# Patient Record
Sex: FEMALE | Race: BLACK OR AFRICAN AMERICAN | NOT HISPANIC OR LATINO | Employment: OTHER | URBAN - METROPOLITAN AREA
[De-identification: names, ages, dates, MRNs, and addresses within clinical notes are randomized per-mention and may not be internally consistent; named-entity substitution may affect disease eponyms.]

---

## 2017-01-18 ENCOUNTER — OFFICE VISIT - HEALTHEAST (OUTPATIENT)
Dept: GERIATRICS | Facility: CLINIC | Age: 77
End: 2017-01-18

## 2017-01-18 ENCOUNTER — AMBULATORY - HEALTHEAST (OUTPATIENT)
Dept: ADMINISTRATIVE | Facility: CLINIC | Age: 77
End: 2017-01-18

## 2017-01-18 DIAGNOSIS — R52 PAIN: ICD-10-CM

## 2017-01-18 DIAGNOSIS — E46 MALNUTRITION (H): ICD-10-CM

## 2017-01-18 DIAGNOSIS — R62.51 FAILURE TO THRIVE (0-17): ICD-10-CM

## 2017-01-18 DIAGNOSIS — R13.10 DYSPHAGIA: ICD-10-CM

## 2017-01-18 DIAGNOSIS — C90.00 MULTIPLE MYELOMA (H): ICD-10-CM

## 2017-01-18 RX ORDER — OXYCODONE HYDROCHLORIDE 5 MG/1
2.5-5 TABLET ORAL EVERY 4 HOURS PRN
Status: SHIPPED | COMMUNITY
Start: 2017-01-18

## 2017-01-18 RX ORDER — LIDOCAINE 50 MG/G
1 PATCH TOPICAL 2 TIMES DAILY
Status: SHIPPED | COMMUNITY
Start: 2017-01-18

## 2017-01-18 RX ORDER — MECOBALAMIN 5000 MCG
15 TABLET,DISINTEGRATING ORAL 2 TIMES DAILY
Status: SHIPPED | COMMUNITY
Start: 2017-01-18

## 2017-01-18 RX ORDER — ACETAMINOPHEN 500 MG
1000 TABLET ORAL 3 TIMES DAILY
Status: SHIPPED | COMMUNITY
Start: 2017-01-18

## 2017-01-18 RX ORDER — MIRTAZAPINE 7.5 MG/1
7.5 TABLET, FILM COATED ORAL AT BEDTIME
Status: SHIPPED | COMMUNITY
Start: 2017-01-18

## 2017-01-24 ENCOUNTER — OFFICE VISIT - HEALTHEAST (OUTPATIENT)
Dept: GERIATRICS | Facility: CLINIC | Age: 77
End: 2017-01-24

## 2017-01-24 DIAGNOSIS — R52 PAIN: ICD-10-CM

## 2017-01-24 DIAGNOSIS — C90.00 MULTIPLE MYELOMA (H): ICD-10-CM

## 2017-01-24 DIAGNOSIS — E46 MALNUTRITION (H): ICD-10-CM

## 2017-01-27 ENCOUNTER — OFFICE VISIT - HEALTHEAST (OUTPATIENT)
Dept: GERIATRICS | Facility: CLINIC | Age: 77
End: 2017-01-27

## 2017-01-27 DIAGNOSIS — E46 PROTEIN MALNUTRITION (H): ICD-10-CM

## 2017-01-27 DIAGNOSIS — R41.89 COGNITIVE IMPAIRMENT: ICD-10-CM

## 2017-01-27 DIAGNOSIS — C90.00 MULTIPLE MYELOMA (H): ICD-10-CM

## 2017-01-27 DIAGNOSIS — R53.81 PHYSICAL DECONDITIONING: ICD-10-CM

## 2017-01-27 DIAGNOSIS — R53.1 GENERAL WEAKNESS: ICD-10-CM

## 2017-01-27 DIAGNOSIS — R13.10 DYSPHAGIA: ICD-10-CM

## 2017-01-31 ENCOUNTER — OFFICE VISIT - HEALTHEAST (OUTPATIENT)
Dept: GERIATRICS | Facility: CLINIC | Age: 77
End: 2017-01-31

## 2017-01-31 DIAGNOSIS — R53.1 GENERAL WEAKNESS: ICD-10-CM

## 2017-01-31 DIAGNOSIS — C90.00 MULTIPLE MYELOMA (H): ICD-10-CM

## 2017-01-31 DIAGNOSIS — R53.81 PHYSICAL DECONDITIONING: ICD-10-CM

## 2017-01-31 DIAGNOSIS — E46 PROTEIN MALNUTRITION (H): ICD-10-CM

## 2017-01-31 DIAGNOSIS — R52 UNCONTROLLED PAIN: ICD-10-CM

## 2017-02-03 ENCOUNTER — OFFICE VISIT - HEALTHEAST (OUTPATIENT)
Dept: GERIATRICS | Facility: CLINIC | Age: 77
End: 2017-02-03

## 2017-02-03 DIAGNOSIS — C90.00 MULTIPLE MYELOMA (H): ICD-10-CM

## 2017-02-03 DIAGNOSIS — R53.81 PHYSICAL DECONDITIONING: ICD-10-CM

## 2017-02-03 DIAGNOSIS — R53.1 GENERAL WEAKNESS: ICD-10-CM

## 2017-02-03 DIAGNOSIS — R52 UNCONTROLLED PAIN: ICD-10-CM

## 2017-02-03 DIAGNOSIS — E46 PROTEIN MALNUTRITION (H): ICD-10-CM

## 2017-02-06 ENCOUNTER — AMBULATORY - HEALTHEAST (OUTPATIENT)
Dept: GERIATRICS | Facility: CLINIC | Age: 77
End: 2017-02-06

## 2017-02-06 ENCOUNTER — COMMUNICATION - HEALTHEAST (OUTPATIENT)
Dept: GERIATRICS | Facility: CLINIC | Age: 77
End: 2017-02-06

## 2017-02-07 ENCOUNTER — COMMUNICATION - HEALTHEAST (OUTPATIENT)
Dept: GERIATRICS | Facility: CLINIC | Age: 77
End: 2017-02-07

## 2021-03-09 ENCOUNTER — AMBULATORY - HEALTHEAST (OUTPATIENT)
Dept: NURSING | Facility: CLINIC | Age: 81
End: 2021-03-09

## 2021-03-29 ENCOUNTER — AMBULATORY - HEALTHEAST (OUTPATIENT)
Dept: NURSING | Facility: CLINIC | Age: 81
End: 2021-03-29

## 2021-04-02 ENCOUNTER — RECORDS - HEALTHEAST (OUTPATIENT)
Dept: RADIOLOGY | Facility: CLINIC | Age: 81
End: 2021-04-02

## 2021-04-02 ENCOUNTER — HOSPITAL ENCOUNTER (OUTPATIENT)
Dept: PHYSICAL MEDICINE AND REHAB | Facility: CLINIC | Age: 81
Discharge: HOME | End: 2021-04-02
Attending: PHYSICIAN ASSISTANT

## 2021-04-02 DIAGNOSIS — C90.00 MULTIPLE MYELOMA, REMISSION STATUS UNSPECIFIED (H): ICD-10-CM

## 2021-04-02 DIAGNOSIS — M48.062 SPINAL STENOSIS OF LUMBAR REGION WITH NEUROGENIC CLAUDICATION: ICD-10-CM

## 2021-04-02 DIAGNOSIS — M43.16 SPONDYLOLISTHESIS OF LUMBAR REGION: ICD-10-CM

## 2021-04-02 DIAGNOSIS — G89.29 CHRONIC BILATERAL LOW BACK PAIN WITHOUT SCIATICA: ICD-10-CM

## 2021-04-02 DIAGNOSIS — M54.50 CHRONIC BILATERAL LOW BACK PAIN WITHOUT SCIATICA: ICD-10-CM

## 2021-04-02 DIAGNOSIS — M47.816 LUMBAR FACET ARTHROPATHY: ICD-10-CM

## 2021-04-02 ASSESSMENT — MIFFLIN-ST. JEOR: SCORE: 1098.65

## 2021-04-04 ENCOUNTER — HOSPITAL ENCOUNTER (OUTPATIENT)
Dept: MRI IMAGING | Facility: CLINIC | Age: 81
Discharge: HOME | End: 2021-04-04
Attending: PHYSICIAN ASSISTANT

## 2021-04-04 DIAGNOSIS — M54.50 CHRONIC BILATERAL LOW BACK PAIN WITHOUT SCIATICA: ICD-10-CM

## 2021-04-04 DIAGNOSIS — G89.29 CHRONIC BILATERAL LOW BACK PAIN WITHOUT SCIATICA: ICD-10-CM

## 2021-04-04 DIAGNOSIS — C90.00 MULTIPLE MYELOMA, REMISSION STATUS UNSPECIFIED (H): ICD-10-CM

## 2021-04-05 ENCOUNTER — COMMUNICATION - HEALTHEAST (OUTPATIENT)
Dept: PHYSICAL MEDICINE AND REHAB | Facility: CLINIC | Age: 81
End: 2021-04-05

## 2021-04-05 DIAGNOSIS — M54.16 LUMBAR RADICULOPATHY: ICD-10-CM

## 2021-04-05 DIAGNOSIS — F41.9 ANXIETY: ICD-10-CM

## 2021-04-05 RX ORDER — LORAZEPAM 1 MG/1
TABLET ORAL
Qty: 2 TABLET | Refills: 0 | Status: SHIPPED | OUTPATIENT
Start: 2021-04-05

## 2021-04-07 ENCOUNTER — COMMUNICATION - HEALTHEAST (OUTPATIENT)
Dept: PHYSICAL MEDICINE AND REHAB | Facility: CLINIC | Age: 81
End: 2021-04-07

## 2021-04-14 ENCOUNTER — HOSPITAL ENCOUNTER (OUTPATIENT)
Dept: PHYSICAL MEDICINE AND REHAB | Facility: CLINIC | Age: 81
Discharge: HOME | End: 2021-04-14
Attending: PHYSICIAN ASSISTANT

## 2021-04-14 DIAGNOSIS — M54.16 LUMBAR RADICULOPATHY: ICD-10-CM

## 2021-04-19 ENCOUNTER — COMMUNICATION - HEALTHEAST (OUTPATIENT)
Dept: SCHEDULING | Facility: CLINIC | Age: 81
End: 2021-04-19

## 2021-04-20 ENCOUNTER — COMMUNICATION - HEALTHEAST (OUTPATIENT)
Dept: PHYSICAL MEDICINE AND REHAB | Facility: CLINIC | Age: 81
End: 2021-04-20

## 2021-04-22 ENCOUNTER — HOSPITAL ENCOUNTER (OUTPATIENT)
Dept: PHYSICAL MEDICINE AND REHAB | Facility: CLINIC | Age: 81
Discharge: HOME | End: 2021-04-22
Attending: PHYSICIAN ASSISTANT

## 2021-04-22 DIAGNOSIS — M43.16 SPONDYLOLISTHESIS OF LUMBAR REGION: ICD-10-CM

## 2021-04-22 DIAGNOSIS — M54.16 LUMBAR RADICULOPATHY: ICD-10-CM

## 2021-04-22 RX ORDER — CYCLOBENZAPRINE HCL 5 MG
5 TABLET ORAL
Status: SHIPPED | COMMUNITY
Start: 2021-03-03

## 2021-04-22 ASSESSMENT — MIFFLIN-ST. JEOR: SCORE: 1098.65

## 2021-04-29 ENCOUNTER — COMMUNICATION - HEALTHEAST (OUTPATIENT)
Dept: PHYSICAL MEDICINE AND REHAB | Facility: CLINIC | Age: 81
End: 2021-04-29

## 2021-05-04 ENCOUNTER — RECORDS - HEALTHEAST (OUTPATIENT)
Dept: ADMINISTRATIVE | Facility: OTHER | Age: 81
End: 2021-05-04

## 2021-06-05 VITALS — BODY MASS INDEX: 27.6 KG/M2 | WEIGHT: 150 LBS | HEIGHT: 62 IN

## 2021-06-05 VITALS — HEIGHT: 62 IN | WEIGHT: 150 LBS | BODY MASS INDEX: 27.6 KG/M2

## 2021-06-08 NOTE — PROGRESS NOTES
[facility]    [facility] [[id number][facility]   Code Status: FULL CODE      CHIEF COMPLAINT/REASON FOR VISIT:  Chief Complaint   Patient presents with     Follow Up     rehab       HISTORY:      HPI: Radha is a 76 y.o. female Who I had the pleasure of visiting with again today secondary to multiple chronic medical conditions. We did spend some time talking about therapy and she overall feels that she's making pretty good progress. No significant pain. Earlier in the week we did decrease her stool softeners. She is getting extra house nutrient supplement. Does have a follow up with oncology February 2 for her multiple myeloma. She does have various aches and pains but they do seem to be working out themselves with therapy at this time. Otherwise really did not have any further questions she feels like she's right on target and she is hoping to be discharged by next week    Past Medical History   Diagnosis Date     Multiple myeloma      Severe malnutrition              No family history on file.  Social History     Social History     Marital status: Legally      Spouse name: N/A     Number of children: N/A     Years of education: N/A     Social History Main Topics     Smoking status: Former Smoker     Smokeless tobacco: Not on file      Comment: smoked for 20-30 years, maybe 1/2 pack per day      Alcohol use No     Drug use: No     Sexual activity: Not on file     Other Topics Concern     Not on file     Social History Narrative     No narrative on file         Review of Systems  Currently she denies any chills or fever coughing wheezing chest pain dizziness or vertigo vomiting diarrhea dysuria rashes sourced stiff neck swollen glands or headaches. Nausea secondary to chemotherapy. History multiple myeloma  .        Current Outpatient Prescriptions:     acetaminophen (TYLENOL) 500 MG tablet, Take 1,000 mg by mouth 3 (three) times a day., Disp: , Rfl:      lansoprazole (PREVACID) 15 MG capsule, Take 15 mg by mouth 2 (two) times a day., Disp: , Rfl:     lidocaine (LIDODERM) 5 %, Place 1 patch on the skin 2 (two) times a day. Apply one Patch to Back, Disp: , Rfl:     mirtazapine (REMERON) 7.5 MG tablet, Take 7.5 mg by mouth bedtime., Disp: , Rfl:     oxyCODONE (ROXICODONE) 5 MG immediate release tablet, Take 2.5-5 mg by mouth every 4 (four) hours as needed for pain. 2.5mg tab for pain 1-5 5mg tab for pain 5-10, Disp: , Rfl:     senna (SENOKOT) 8.6 mg tablet, Take 1 tablet by mouth 2 (two) times a day., Disp: , Rfl:   .  Vitals:    01/31/17 1209   BP: 115/73   Pulse: 84   Resp: 18   Temp: 99  F (37.2  C)   SpO2: 95%       Physical Exam  Constitutional: She appears well-nourished. No distress.   HENT:   Head: Normocephalic.   Eyes: Pupils are equal, round, and reactive to light.   Neck: Neck supple. No thyromegaly present.   Cardiovascular: Normal rate, regular rhythm and normal heart sounds.   Pulmonary/Chest: Breath sounds normal.   Abdominal: Bowel sounds are normal. There is no tenderness. There is no guarding.   Protein malnutrition.   Musculoskeletal:   Generalized weakness and deconditioning. Right knee scar. Chronic pain.   Lymphadenopathy:   She has no cervical adenopathy.   Neurological: She is alert.   Skin: Skin is warm and dry. No rash noted.   Psychiatric: Her behavior is normal.     LABS:   No results found for this or any previous visit.      No results found for: WBC, HGB, HCT, MCV, PLT      ASSESSMENT:      ICD-10-CM    1. Multiple myeloma C90.00    2. Protein malnutrition E46    3. General weakness R53.1    4. Physical deconditioning R53.81        PLAN:    She does have a oncology visit February 2. Then she will continue to work with therapy to work out some of her basic aches and pains and then she wants to be able to be discharged by next week.      Electronically signed by: Michael Duane Johnson, CNP

## 2021-06-08 NOTE — PROGRESS NOTES
[facility]      [facility] [[id number][facility]  Code Status: FULL CODE      Chief Complaint/Reason for Visit:  Chief Complaint   Patient presents with     H & P       HPI:   Radha is a 76 y.o. female who was admitted to [facility] on Jan 5th and transferred to  this facility on January 16, 2017. She used to live in [place] until her she moved back to [place] in December of last year to be closer to her daughter. She is a retired office attendant. She is born and raised in [place] but spent the last many years in [place]. She was brought in by her daughter because of pain, weight loss, and over all inability to manage at home. She had vertebral kyphoplasty in August 2016. Since then she has had a steady decline. In about four or five months, she has had 70 pound weight loss. Her daughter took her back to [place] to be closer to her daughter, and established care with [facility] clinic.    Workup began. Eventually led to the diagnosis of multiple myeloma through a bone marrow aspirate biopsy. She complains of pain all over as well as decreased appetite. Particularly painful are the right hip and the left set of rims. She has fractures on the eighth and ninth rib on the right. TSH was normal. Spep test shows IGA Landau monoclonal spikes. As part of the workup this of dysphagia and decrease oral intake EGD was also done showing inflammation of the esophagus and the stomach.    She was capitalized in the hospital in transferred here to the TCU as she might benefit from a. Of therapy before going back home. She was very pleasant during my visit. She doesn't seem to be kind of severe pain. She does have discomfort but not if she stay still in bed like she was during my visit. When she moves when she works with physical therapy her pain could go up to 8 to 9 out of 10.    She denies any pain with swallowing denies any cough or choking episodes with  swallowing. Denies any trouble with sleep stating that when she goes to sleep she sleeps like a log. She also does not feel like she is depressed denies feeling hopeless denies suicidal nor homicidal tendencies    She is inches of the next step being planned for her as far as her multiple myeloma.    Denies any shortness of breath or chest pains abdominal pains no constipation no urinary symptoms      Past Medical History:  Past Medical History   Diagnosis Date     Multiple myeloma      Severe malnutrition            Surgical History:  Past Surgical History   Procedure Laterality Date     Vertebral kyphoplasty        Total knee arthroplasty       Bone marrow biopsy         Family History:   No family history on file.    Social History:    Social History     Social History     Marital status: Legally      Spouse name: N/A     Number of children: N/A     Years of education: N/A     Social History Main Topics     Smoking status: Former Smoker     Smokeless tobacco: Not on file      Comment: smoked for 20-30 years, maybe 1/2 pack per day      Alcohol use No     Drug use: No     Sexual activity: Not on file     Other Topics Concern     Not on file     Social History Narrative     No narrative on file          Review of Systems  As above otherwise negative  There were no vitals filed for this visit.    Physical Exam  VS noted  Patient is alert, awake, oriented to time, place and person, not in acute cardiorespiratory distress  Skin: Warm, and moist,  no lesions,   Head: atraumatic, normocephalic,   Eyes: EOM's intact and conjugate, pink palpebral conjunctivae, anicteric sclerae, pupils reactive  Lungs : Clear to auscultation , no crackles, wheezes or rhonchi  Heart : Nornal first and second heart sounds, No murmurs, heaves, or thrills  Abdomen: Soft, non tender, non distended, no hepatosplenomegaly, no ascites  Extremities: No edema, pulses are full and equal      Medication List:  Current Outpatient  Prescriptions   Medication Sig     acetaminophen (TYLENOL) 500 MG tablet Take 1,000 mg by mouth 3 (three) times a day.     lansoprazole (PREVACID) 15 MG capsule Take 15 mg by mouth 2 (two) times a day.     lidocaine (LIDODERM) 5 % Place 1 patch on the skin 2 (two) times a day. Apply one Patch to Back     mirtazapine (REMERON) 7.5 MG tablet Take 7.5 mg by mouth bedtime.     oxyCODONE (ROXICODONE) 5 MG immediate release tablet Take 2.5-5 mg by mouth every 4 (four) hours as needed for pain. 2.5mg tab for pain 1-5  5mg tab for pain 5-10     senna (SENOKOT) 8.6 mg tablet Take 1 tablet by mouth 2 (two) times a day.       Labs:  No results found for this or any previous visit (from the past 72 hour(s)).      Assessment:    ICD-10-CM    1. Multiple myeloma C90.00    2. Pain R52    3. Failure to thrive (0-17) R62.51    4. Dysphagia R13.10    5. Malnutrition E46        Plan:  Will need a period of PT and OT for strengthening. Nutrition to guide in optimizing caloric needs.   Sdequate pain controlCurrent regimen works  FF up with Onc after discharge for next steps in MM management.  Does not fee ldepressed. No sleep issues. Remeron might help her with appetitie    Total time spent was 60 minutes with more than 50% spent on counseling, discussion of treatment plan and extensive review of available records    This note has been dictated using voice recognition software. Any grammatical, typographical, or context distortions are unintentional.          Electronically signed by: Ondina Hitchcock MD

## 2021-06-08 NOTE — PROGRESS NOTES
[facility]    Facility:   [facility] [[id number]]   Code Status: FULL CODE      CHIEF COMPLAINT/REASON FOR VISIT:  Chief Complaint   Patient presents with     Review Of Multiple Medical Conditions       HISTORY:      HPI: Radha is a 76 y.o. female Was seen today in follow-up of her multiple medical problems. She was admitted to [facility] on January 5 secondary to weight loss, failure to thrive, and ongoing severe back pain. She was recently found to have multiple myeloma via bone marrow aspirate. She also has rib fractures on the right ribs eight and nine.    She continues to have pain particularly on the lower back. Movement and physical therapy really makes her uncomfortable. There is a component of muscle spasms. She denies being to drowsy from her current regimen of oxycodone 5 to 10 mg every four hours. Of which she has been taking anywhere from 2 to 6 tablets in a day.    Having loose stools then she would like. Currently on Sennawo times a day.    Denies any other complaints such as fever headache or dizziness no abdominal pain no nausea no dysuria.    Past Medical History   Diagnosis Date     Multiple myeloma      Severe malnutrition              No family history on file.  Social History     Social History     Marital status: Legally      Spouse name: N/A     Number of children: N/A     Years of education: N/A     Social History Main Topics     Smoking status: Former Smoker     Smokeless tobacco: Not on file      Comment: smoked for 20-30 years, maybe 1/2 pack per day      Alcohol use No     Drug use: No     Sexual activity: Not on file     Other Topics Concern     Not on file     Social History Narrative     No narrative on file         Review of Systems  unremarkable  .There were no vitals filed for this visit.    Physical Exam    VS ntoed stable  Patient is alert, awake, oriented to time, place and person, not in acute cardiorespiratory  distress  Skin: Warm, and moist,  no lesions,   Head: atraumatic, normocephalic,   Eyes: EOM's intact and conjugate, pink palpebral conjunctivae, anicteric sclerae, pupils reactive  Lungs : Clear to auscultation , no crackles, wheezes or rhonchi  Heart : Nornal first and second heart sounds, No murmurs, heaves, or thrills  Abdomen: Soft, non tender, non distended, no hepatosplenomegaly, no ascites  Extremities: No edema, pulses are full and equal  Point tenderness on lower back. Right set of ribs,     LABS:   No results found for this or any previous visit (from the past 72 hour(s)).      ASSESSMENT:      ICD-10-CM    1. Multiple myeloma C90.00    2. General weakness R53.1    3. Physical deconditioning R53.81    4. Uncontrolled pain R52        PLAN:    In this case, with her multiple myeloma which is known to be associated with bone pains, I think she would benefit from a low dose Arbon patch that would provide steady state pain relief rather than the roller coaster effects of short acting narcotics. I will stop her oxycodone and start her on a small dose of new half of 12.5 mg fentanyl patch. She is requiring less than 12.5 mg morphine equivalent of narcotic per 24 hour's.    She is to let me know if this is helping. We have a few days before she gets discharged back to home. She would like to remain is functional in be able to do things with her kids and grandkids with out pain limiting her. I will also add muscle rub as needed for muscle spasms    Discontinue stool softeners      Total 25 minutes of which 55% was spent counseling and coordination of care of the above plan.    Electronically signed by: Ondina Hitchcock MD

## 2021-06-08 NOTE — PROGRESS NOTES
[facility]    Facility:   [facility] [[id number]]   Code Status: FULL CODE      CHIEF COMPLAINT/REASON FOR VISIT:  Chief Complaint   Patient presents with     Follow Up     mult myel       HISTORY:      HPI: Radha is a 76 y.o. female Who I had the pleasure of visiting with today secondary to hospitalization January 5 to January 16 secondary to multiple myeloma along with a concern for dysphagia severe malnutrition putting weight loss along with generalized weakness and deconditioning. Currently her pain has been managed. She is able to perform in therapy her only major complaint is with the chronic right knee pain she's had surgery on the right knee. She also has had a good appetite. She's had some loose stools but she's also in stool softeners. She has been normotensive afebrile also on room air and getting extra house nutrient supplements.    Past Medical History   Diagnosis Date     Multiple myeloma      Severe malnutrition              No family history on file.  Social History     Social History     Marital status: Legally      Spouse name: N/A     Number of children: N/A     Years of education: N/A     Social History Main Topics     Smoking status: Former Smoker     Smokeless tobacco: Not on file      Comment: smoked for 20-30 years, maybe 1/2 pack per day      Alcohol use No     Drug use: No     Sexual activity: Not on file     Other Topics Concern     Not on file     Social History Narrative     No narrative on file         Review of Systems  Currently she denies any chills or fever coughing wheezing chest pain dizziness or vertigo  vomiting diarrhea dysuria rashes sourced stiff neck swollen glands or headaches. Nausea secondary to chemotherapy. History multiple myeloma  .      Current Outpatient Prescriptions:      acetaminophen (TYLENOL) 500 MG tablet, Take 1,000 mg by mouth 3 (three) times a day., Disp: , Rfl:      lansoprazole (PREVACID) 15 MG  capsule, Take 15 mg by mouth 2 (two) times a day., Disp: , Rfl:      lidocaine (LIDODERM) 5 %, Place 1 patch on the skin 2 (two) times a day. Apply one Patch to Back, Disp: , Rfl:      mirtazapine (REMERON) 7.5 MG tablet, Take 7.5 mg by mouth bedtime., Disp: , Rfl:      oxyCODONE (ROXICODONE) 5 MG immediate release tablet, Take 2.5-5 mg by mouth every 4 (four) hours as needed for pain. 2.5mg tab for pain 1-5 5mg tab for pain 5-10, Disp: , Rfl:      senna (SENOKOT) 8.6 mg tablet, Take 1 tablet by mouth 2 (two) times a day., Disp: , Rfl:     Vitals:    01/27/17 1243   BP: 101/56   Pulse: 89   Resp: 20   Temp: 98.2  F (36.8  C)   SpO2: 97%       Physical Exam   Constitutional: She appears well-nourished. No distress.   HENT:   Head: Normocephalic.   Eyes: Pupils are equal, round, and reactive to light.   Neck: Neck supple. No thyromegaly present.   Cardiovascular: Normal rate, regular rhythm and normal heart sounds.    Pulmonary/Chest: Breath sounds normal.   Abdominal: Bowel sounds are normal. There is no tenderness. There is no guarding.   Protein malnutrition.   Musculoskeletal:   Generalized weakness and deconditioning. Right knee scar. Chronic pain.   Lymphadenopathy:     She has no cervical adenopathy.   Neurological: She is alert.   Skin: Skin is warm and dry. No rash noted.   Psychiatric: Her behavior is normal.         LABS:   No results found for this or any previous visit.      No results found for: WBC, HGB, HCT, MCV, PLT  No results found for: LABPROT, ALBUMIN      ASSESSMENT:      ICD-10-CM    1. Multiple myeloma C90.00    2. General weakness R53.1    3. Physical deconditioning R53.81    4. Protein malnutrition E46    5. Cognitive impairment R41.89    6. Dysphagia R13.10        PLAN:    .Changing up the stool softeners to once a day rather than BID. Continue with current medications and treatments along with therapy recommendations. Did have a chance to see the oncology group on the 26th her next visit is  February 2.    Electronically signed by: Michael Duane Johnson, CNP

## 2021-06-08 NOTE — PROGRESS NOTES
[facility]    [facility] [[id number][facility]   Code Status: FULL CODE  PCP: No Primary Care Provider   Phone: None   Fax: [phone]      CHIEF COMPLAINT/REASON FOR VISIT:  Chief Complaint   Patient presents with     Discharge Summary       HISTORY COURSE:  Radha is a 76 y.o. female who was admitted to [facility] on Jan 5th and transferred to  this facility on January 16, 2017. She used to live in [place] until her she moved back to [place] in December of last year to be closer to her daughter. She is a retired office attendant. She is born and raised in [place] but spent the last many years in [place]. She was brought in by her daughter because of pain, weight loss, and over all inability to manage at home. She had vertebral kyphoplasty in August 2016. Since then she has had a steady decline. In about four or five months, she has had 70 pound weight loss. Her daughter took her back to [place] to be closer to her daughter, and established care with [facility] clinic.     Workup began. Eventually led to the diagnosis of multiple myeloma through a bone marrow aspirate biopsy. She complains of pain all over as well as decreased appetite. Particularly painful are the right hip and the left set of rims. She has fractures on the eighth and ninth rib on the right. TSH was normal. Spep test shows IGA Landau monoclonal spikes. As part of the workup this of dysphagia and decrease oral intake EGD was also done showing inflammation of the esophagus and the stomach.     She was capitalized in the hospital in transferred here to the TCU as she might benefit from a. Of therapy before going back home. She was very pleasant during my visit. She doesn't seem to be kind of severe pain. She does have discomfort but not if she stay still in bed like she was during my visit. When she moves when she works with physical therapy her pain could go up to 8 to 9 out of  10.   She worked with physical therapy. At the time of discharge she was walking with supervision walking 280 feet using a four wheeled walker. She's dressing up independently and cognitive scores are good. 4.8 out of 5.6    She did have significant pain especially on the lower back. Saw her oncologist impurity part of February and agreed to continue oxycodone 5 mg 1 to 2 tablets every six hours as needed. She has an appointment to follow up with them to discuss further treatment options with her multiple myeloma.    She worked with our nutritionist and was compliant and tolerated the house supplements. This will continue even at home. She will go home and live with her daughter. Who is involved with her care.          Review of Systems  unremarkable  There were no vitals filed for this visit.    Physical Exam  VS noted stable  Patient is alert, awake, oriented to time, place and person, not in acute cardiorespiratory distress  Skin: Warm, and moist,  no lesions,   Head: atraumatic, normocephalic,   Eyes: EOM's intact and conjugate, pink palpebral conjunctivae, anicteric sclerae, pupils reactive  Lungs : Clear to auscultation , no crackles, wheezes or rhonchi  Heart : Nornal first and second heart sounds, No murmurs, heaves, or thrills  Abdomen: Soft, non tender, non distended, no hepatosplenomegaly, no ascites  Extremities: No edema, pulses are full and equal      MEDICATION LIST:  Current Outpatient Prescriptions   Medication Sig     acetaminophen (TYLENOL) 500 MG tablet Take 1,000 mg by mouth 3 (three) times a day.     lansoprazole (PREVACID) 15 MG capsule Take 15 mg by mouth 2 (two) times a day.     lidocaine (LIDODERM) 5 % Place 1 patch on the skin 2 (two) times a day. Apply one Patch to Back     mirtazapine (REMERON) 7.5 MG tablet Take 7.5 mg by mouth bedtime.     oxyCODONE (ROXICODONE) 5 MG immediate release tablet Take 2.5-5 mg by mouth every 4 (four) hours as needed for pain. 2.5mg tab for pain 1-5  5mg tab  for pain 5-10   Updated Medication list, printed and signed at discharge, please refer to that final medication list from the Skilled Nursing Facility for accuracy.      DISCHARGE DIAGNOSIS:    ICD-10-CM    1. Multiple myeloma C90.00    2. General weakness R53.1    3. Protein malnutrition E46    4. Physical deconditioning R53.81    5. Uncontrolled pain R52        MEDICAL EQUIPMENT NEEDS:  4ww    DISCHARGE PLAN/FACE TO FACE:  I certify that this patient is under my care and that I, or a nurse practitioner or physician's assistant working with me, had a face-to-face encounter that meets the physician face-to-face encounter requirements with this patient.   Date of Face-to-Face Encounter: 2/3/17    I certify that, based on my findings, the following services are medically necessary home health services: PT/OT/    My clinical findings support the need for the above skilled services because: (Please write a brief narrative summary that describes what the RN, PT, SLP, or other services will be doing in the home. A list of diagnoses in this section does not meet the CMS requirements.) physical deonditioning, gait instability    This patient is homebound because: (Please write a brief narrative summary describing the functional limitations as to why this patient is homebound and specifically what makes this patient homebound.) same    The patient is, or has been, under my care and I have initiated the establishment of the plan of care. This patient will be followed by a physician who will periodically review the plan of care.  Ff up with PCP within a week. At 120 her stay in the transitional care unit, we discussed about possibly placing her on a fentanyl patch rather than having to take her oxycodone every six hours. However the equivalent fentanyl dose of her oxycodone intake is too small of an amount in fentanyl patch cannot be cut in half. This will need to be an ongoing discussion with the patient and her treating  doctors, depending on the progress of her pain level at this current time she is fine with taking oxycodone every six hours. But again hopes that she can be placed on a patch.    Total discharge time was more than 35 minutes.  This note has been dictated using voice recognition software. Any grammatical, typographical, or context distortions are unintentional.      Electronically signed by: Ondina Hitchcock MD

## 2021-06-08 NOTE — PROGRESS NOTES
[facility]    Facility:   [facility] [[id number]]   Code Status: FULL CODE      CHIEF COMPLAINT/REASON FOR VISIT:  Chief Complaint   Patient presents with     Review Of Multiple Medical Conditions       HISTORY:      HPI: Radha is a 76 y.o. female Was seen today in follow-up of her multiple issues. Was admitted to [facility] because of failure to thrive. Lost 70 pounds of weight in four or five months. Accompanied by general decline in malnutrition. She moved from [place] to [place] in December.    Her appetite has definitely improved. She is also feeling much stronger.  However pain is still the major issue. Penis generalized but mainly on the lower extremities particularly the lateral tubercle of the [place] bilaterally. Oxycodone PRN helps significantly. She's taking Caroline for constipation. No other complaints      Past Medical History   Diagnosis Date     Multiple myeloma      Severe malnutrition              No family history on file.  Social History     Social History     Marital status: Legally      Spouse name: N/A     Number of children: N/A     Years of education: N/A     Social History Main Topics     Smoking status: Former Smoker     Smokeless tobacco: Not on file      Comment: smoked for 20-30 years, maybe 1/2 pack per day      Alcohol use No     Drug use: No     Sexual activity: Not on file     Other Topics Concern     Not on file     Social History Narrative     No narrative on file         Review of Systems  Unremarkable  .There were no vitals filed for this visit.    Physical Exam  VS noted and stable  Patient is alert, awake, oriented to time, place and person, not in acute cardiorespiratory distress  Skin: Warm, and moist,  no lesions,   Head: atraumatic, normocephalic,   Eyes: EOM's intact and conjugate, pink palpebral conjunctivae, anicteric sclerae, pupils reactive  Lungs : Clear to auscultation , no crackles, wheezes or rhonchi  Heart :  Nornal first and second heart sounds, No murmurs, heaves, or thrills  Abdomen: Soft, non tender, non distended, no hepatosplenomegaly, no ascites  Extremities: No edema, pulses are full and equal        LABS:   No results found for this or any previous visit (from the past 72 hour(s)).  No results found for this or any previous visit (from the past 168 hour(s)).      ASSESSMENT:      ICD-10-CM    1. Pain R52    2. Multiple myeloma C90.00    3. Malnutrition E46        PLAN:    Seems to be stable.   Keep on PRN oxycodone PRN,.   Continue bowel regimen,   Ff up with Onc for plan of treatment for her MM.          Electronically signed by: Ondina Hitchcock MD

## 2021-06-08 NOTE — PROGRESS NOTES
[facility] Patient Outreach:     Discharge Date::  2-3-2017      Reason for TCU stay (discharge diagnosis)::  Multi myeloma      Are you feeling better, the same or worse since your discharge?:  Patient is feeling better          As part of your discharge plan, did they discuss home care with you?: Yes        Have your seen them yet, or are they scheduled to visit?: Yes                Do you have any follow up visits scheduled with your PCP or Specialist?:  Yes, with PCP      (RN) Is it scheduled soon enough (3-5 days)?: Yes

## 2021-06-15 PROBLEM — R41.89 COGNITIVE IMPAIRMENT: Status: ACTIVE | Noted: 2017-01-27

## 2021-06-15 PROBLEM — E46 PROTEIN MALNUTRITION (H): Status: ACTIVE | Noted: 2017-01-27

## 2021-06-15 PROBLEM — R53.1 GENERAL WEAKNESS: Status: ACTIVE | Noted: 2017-01-27

## 2021-06-15 PROBLEM — C90.00 MULTIPLE MYELOMA (H): Status: ACTIVE | Noted: 2017-01-27

## 2021-06-15 PROBLEM — R53.81 PHYSICAL DECONDITIONING: Status: ACTIVE | Noted: 2017-01-27

## 2021-06-15 PROBLEM — R13.10 DYSPHAGIA: Status: ACTIVE | Noted: 2017-01-27

## 2021-06-16 NOTE — TELEPHONE ENCOUNTER
"Phone call to patient to review results and provider's recommendations. Results given and explained. Discussed recommended injection. Explained injection process. She would like to proceed with injection. Order placed in Epic. Injection requirements reviewed with patient. Discussed steroids have immunosuppressant properties which could potentially put patient at a higher risk for a worsened course of any infection including COVID.   Stated understanding.     She would like information regarding injections, the requirements and the education sent to her. She wants to review this with her daughter. She wants to schedule the appointment at this time; 2nd COVID vaccination was on 3/29 so can have it on 4/12 or later.     She is asking for \"something to knock you out.\" Explained patients are not knocked out, but can be given a mild oral sedative. She would like this.   "

## 2021-06-16 NOTE — TELEPHONE ENCOUNTER
Lorazepam rx sent to pharmacy.  Please let her know that Dr. Whitfield will call her to go over the consent.

## 2021-06-16 NOTE — PATIENT INSTRUCTIONS - HE
[facility] Scheduling    Please call [phone] to schedule your image(s) (select option #1). There are 2 different locations, see below. You can do walk-in visits for xray only images if you want.     [facility]  [address] [place] [id number]      [address] [address]

## 2021-06-16 NOTE — PATIENT INSTRUCTIONS - HE
Follow-up visit in 2 weeks with Graciela BLACKMAN to discuss injection outcome and determine care plan going forward.     DISCHARGE INSTRUCTIONS    During office hours (8:00 a.m.- 4:00 p.m.) questions or concerns may be answered  by calling Spine Center Navigation Nurses at  [phone].  Messages received after hours will be returned the following business day.      In the case of an emergency, please dial 911 or seek assistance at the nearest Emergency Room/Urgent Care facility.     All Patients:    ? You may experience an increase in your symptoms for the first 2 days (It may take anywhere between 2 days- 2 weeks for the steroid to have maximum effect).    ? You may use ice on the injection site, as frequently as 20 minutes each hour if needed.    ? You may take your pain medicine.    ? You may continue taking your regular medication after your injection. If you have had a Medial Branch Block you may resume pain medication once your pain diary is completed.    ? You may shower. No swimming, tub bath or hot tub for 48 hours.  You may remove your bandaid/bandage as soon as you are home.    ? You may resume light activities, as tolerated.    ? Resume your usual diet as tolerated.    ? It is strongly advised that you do not drive for 1-3 hours post injection.    ? If you have had oral sedation:  Do not drive for 8 hours post injection.      ? If you have had IV sedation:  Do not drive for 24 hours post injection.  Do not operate hazardous machinery or make important personal/business decisions for 24 hours.      POSSIBLE STEROID SIDE EFFECTS (If steroid/cortisone was used for your procedure)    -If you experience these symptoms, it should only last for a short period      Swelling of the legs                Skin redness (flushing)       Mouth (oral) irritation     Blood sugar (glucose) levels              Sweats                      Mood changes    Headache    Sleeplessness    Weakened immune system for up to 14 days,  which could increase the risk of enma the COVID-19 virus and/or experiencing more severe symptoms of the disease, if exposed.    Decreased effectiveness of the flu vaccine if given within 2 weeks of the steroid.         POSSIBLE PROCEDURE SIDE EFFECTS  -Call the Spine Center if you are concerned    Increased Pain             Increased numbness/tingling        Nausea/Vomiting            Bruising/bleeding at site        Redness or swelling                                                Difficulty walking        Weakness             Fever greater than 100.5    *In the event of a severe headache after an epidural steroid injection that is relieved by lying down, please call the [facility] to speak with a clinical staff member*

## 2021-06-16 NOTE — PATIENT INSTRUCTIONS - HE
A nurse will call you in 1 week to see how you are doing. If you are doing better at that time, you are welcome to follow-up as needed. If you are not doing better, the nurse will relay this information to the physician and then further recommendations can be made. Please do not hesitate to contact the clinic at [phone] if you have any questions/concerns or any worsening of your pain prior to that time. You are also welcome to contact Graciela Carranza via [employer].    The steroid injection typically takes 2-3 weeks to reach its peak effect.  I am hopeful over the next 1-2 weeks you will feel more improvement in your pain.    I think you have irritation of the L5 nerve roots in your back, which affect the fronts of your shins and the tops of your feet.

## 2021-06-16 NOTE — TELEPHONE ENCOUNTER
PSP:  Graciela BLACKMAN  Last clinic visit:  4/2/2021 Consult; 4/14/2021 Injections  Reason for call: Increased pain post-injection  Clinical information:  Call placed to pt's daughter again as was unable to reach her this AM. Pt's daughter, Alison, states that the patient has been experiencing severe increased pain since her injection last week. She states it is mainly in her right leg from her knee down to her foot.   Pt's pain medication is currently managed through palliative care. She has been utilizing lidocaine patches, and Tylenol 1000 mg 3 times daily.   Advice given to patient: Advised in-person follow-up appt with Graciela to reevaluate and discuss. Assisted pt in scheduling appt.   Provider to address: GM

## 2021-06-16 NOTE — TELEPHONE ENCOUNTER
PHONE CONSENT:    The patient wished to take pre-procedure oral sedation so a phone consent was obtained prior to their injection.  The patient has been offered other conservative treatment (physical therapy, medications, etc.) but has opted to proceed with an injection.  The risks and benefits of the injection (Bilateral L4-5 transforaminal epidural steroid injections) were discussed with the patient over the phone on 4-7-21 at 15:52 pm.  The patient was told that the corticosteroid injection will somewhat suppress the immune system.  This could potentially increase the chance of catching the virus if exposed.  If the patient already has the virus, having a corticosteroid injection could potentially worsen the course of the virus.  These are theoretical risks.  The patient opted to proceed with the injection at this time.   Other risks of the injection include infection, bleeding, damage to surrounding structures, nerve injury, permanent weakness, permanent paralysis, worsened pain, soreness, headache, allergic reaction, no change in pain.      The patient's second dose of the COVID vaccine on 3-29-21.     The patient understands that they cannot have symptoms of an infection or be on antibiotics at the time of the injection as this would increase their risk of infection. If they start antibiotics prior to the procedure, they should call the clinic to see if the procedure will need to be rescheduled.  The patient understands that if they start any blood thinners prior to the injection, the provider must be notified immediately.  The patient denies any allergies to iodine contrast dye or iodine products.  The patient denies any symptoms of an active infection (cough, fevers, myalgias) and denies taking antibiotics.  The patient knows not to come in to clinic if they have any symptoms of an active infection, particularly cough, fevers, myalgias.   The patient denies taking any prescription blood thinning medications.  The patient denies any allergies to iodine or iodine contrast.       The patient verbalized understanding of the information given. The patient was given the opportunity to ask questions of the physician.  The patient elected to proceed and gave consent over the phone with Denisse Conrad RN as a witness.  Informed consent form was signed by the physician and the witness.

## 2021-06-16 NOTE — TELEPHONE ENCOUNTER
Triage Call:   Right leg cramping, 10/10 pain, unable to walk, back pain as well. Pain is from the knee down. Patient recently had spinal injections at [facility]. Per protocol guidelines Daughter, Alison, was advised to bring the patient into the ED. Alison declined stating she will being the patient into an urgent care. Alison stated she will bring the patient to an Urgency care room.     COVID 19 Nurse Triage Plan/Patient Instructions    Please be aware that novel coronavirus (COVID-19) may be circulating in the community. If you develop symptoms such as fever, cough, or SOB or if you have concerns about the presence of another infection including coronavirus (COVID-19), please contact your health care provider or visit  [email or web address].    Disposition/Instructions    ED Visit recommended. Follow protocol based instructions.      Bring Your Own Device:  Please also bring your smart device(s) (smart phones, tablets, laptops) and their charging cables for your personal use and to communicate with your care team during your visit.      Thank you for taking steps to prevent the spread of this virus.  o Limit your contact with others.  o Wear a simple mask to cover your cough.  o Wash your hands well and often.    Resources    [facility]: About COVID-19: [email or web address]    CDC: What to Do If You're Sick: www.cdc.gov/coronavirus/2019-ncov/about/steps-when-sick.html    CDC: Ending Home Isolation: www.cdc.gov/coronavirus/2019-ncov/hcp/disposition-in-home-patients.html     CDC: Caring for Someone: www.cdc.gov/coronavirus/2019-ncov/if-you-are-sick/care-for-someone.html     [facility]: Interim Guidance for Hospital Discharge to Home: www.health.[facility].[place].us/diseases/coronavirus/hcp/hospdischarge.pdf    [facility] clinical trials (COVID-19 research studies): [email or web address].[facility]/umn-clinical-trials     Below are the COVID-19 hotlines at the [facility]  [facility] ([facility]). Interpreters are available.   o For health questions: Call 334-587-8383 or 1-209.732.5705 (7 a.m. to 7 p.m.)  o For questions about schools and childcare: Call 316-042-5953 or 1-194.199.6792 (7 a.m. to 7 p.m.)     Violeta Cratagena RN Nurse Triage 4/19/2021 4:50 PM     Reason for Disposition    Unable to walk    Additional Information    Negative: Looks like a broken bone or dislocated joint (e.g., crooked or deformed)    Negative: Sounds like a life-threatening emergency to the triager    Negative: Followed a leg injury    Negative: Leg swelling is main symptom    Negative: Back pain radiating (shooting) into leg(s)    Negative: Knee pain is main symptom    Negative: Ankle pain is main symptom    Negative: Pregnant    Negative: Postpartum (from 0 to 6 weeks after delivery)    Negative: Chest pain    Negative: Difficulty breathing    Negative: Entire foot is cool or blue in comparison to other side    Protocols used: LEG PAIN-A-AH

## 2021-06-16 NOTE — PROGRESS NOTES
Assessment:   Radha Maddox is a 80 y.o. y.o. female with past medical history significant for multiple myeloma (follows with Dr. Everardo Quick [facility]), anxiety, depression who presents today for follow-up regarding chronic and progressive right greater than left low back pain without radicular symptoms.  Patient has multiple chronic compression fractures thoracic and lumbar spine.  My review of an MRI lumbar spine shows multilevel lumbar spondylitic change most pronounced at L4-5 where there is degenerative spondylolisthesis resulting in severe bilateral foraminal stenosis.  There is also severe bilateral foraminal stenosis L3-4 and moderate to severe bilateral foraminal stenosis L5-S1.  Patient status post a bilateral L4-5 transforaminal epidural steroid injection April 14, 2021 (8 days ago) which has not provided any relief of her back pain.  Since the injection she has also been experiencing new pain in the anterior shins and dorsal feet.  Suspect this is related to irritation of the L5 nerve roots from the increased volume in the epidural space after the epidural steroid injection.  On exam she reported a subjective sensory deficit plantar aspects of both feet but was otherwise neurologically intact.  Strength was normal.  Reflexes stable.  Pain has improved over the past 2 days.  She does not have any loss of bowel or bladder control.  No fevers or chills.       Plan:     A shared decision making plan was used.  The patient's values and choices were respected.  The following represents what was discussed and decided upon by the physician assistant and the patient.      1.  DIAGNOSTIC TESTS: I reviewed the MRI lumbar spine.  No further diagnostic tests were ordered.    2.  PHYSICAL THERAPY: I offered for the patient to return to physical therapy.  Most recent course of physical therapy was 2019.  She declined.  She has had multiple courses of physical therapy.    3.  MEDICATIONS:  No changes are made  to the patient's medications.  Patient has established care with palliative [facility]  She takes oxycodone 5 mg 1-2 tabs 3 times daily.  According to the palliative care note they are going to consider adding OxyContin 10 mg at bedtime. She takes Tylenol 1000 mg 3 times daily.  She also uses Flexeril as needed.  She uses Voltaren gel. She has tried gabapentin in the past and it was not helpful.  She is scheduled to follow-up with the palliative care provider tomorrow.  I asked her to speak with her palliative care provider about making medication adjustments if needed.    4.  INTERVENTIONS: No additional interventions were ordered.  -Patient has bilateral L3, L4, L5 radiofrequency ablation May 24, 2019.  She states this was not helpful.  -Patient had tried right L4-5, L5-S1 facet joint injections August 31, 2018 and they were not helpful.  -For future injections I would prescribe lorazepam 0.5 mg, #2 with no refills.  Patient was excessively sedated with 1 mg, #2.    5.  PATIENT EDUCATION: I reassured the patient that I discussed her case with Dr. Whitfield.  Her injection was successful from a technical standpoint.  There were no complications during the procedure.  I explained that irritation of the nerves after an epidural steroid injection is not uncommon.  Pain should improve with time.  Reassured the patient that epidural steroid injections typically if peak effect 2 to 3 weeks after the procedure, so I am hopeful over the next week or 2 she will have a reduction in her pain.  -Patient states she has had some left hand pain since her injection.  Told the patient this could not be related to her epidural steroid injection.  She will monitor for now.    6.  FOLLOW-UP: I offered to see the patient back in the clinic in 1 week to check in.  She prefers to have a nurse call her.  I will have care navigation call the patient in 1 week.  If she is doing better at that time she can follow-up as  needed.  If she is still having severe pain she should follow-up with me again.    Subjective:     Radha Maddox is a 80 y.o. female who presents today for follow-up regarding chronic low back pain.  Patient status post a bilateral L4-5 transforaminal epidural steroid injection April 14, 2021 (8 days ago).  Patient reports the injections did not provide any relief of her back pain.  Since the injection she has also had new pain in the bilateral legs.    Patient complains of bilateral low back pain.  Pain spans across low back.  She feels it also radiates up the back into the thoracic region.  She then experiences pain in the bilateral buttocks and in the bilateral anterior shins and dorsal feet.  Both legs are equally affected.  She has had the pain was so severe 2 days ago that she could barely walk.  Pain has improved significantly since then, but she is not back to baseline level pain.  She has had some cramps in the calves as well.  She denies any numbness or tingling down the legs.  Denies any weakness down the legs.  Denies loss of bowel or bladder control.  Denies any recent fevers, chills, or sweats.    Patient had physical therapy most recently in 2019.  This was pool therapy.  She also previously had land therapy.  She takes oxycodone 5-10 mg every 4 hours as needed for pain.  She takes Tylenol 1000 mg 3 times daily as needed.  She uses cyclobenzaprine as needed.  She also uses Voltaren gel.  She sees palliative care at [facility].    Past medical history is reviewed and is unchanged.    Review of Systems:  Positive for pain much worse at night, difficulty with hand skills.  Negative for numbness/tingling, weakness, loss of bowel/bladder control, inability to urinate, headache, trip/stumble/falls, difficulty swallowing, fevers, unintentional weight loss.     Objective:   CONSTITUTIONAL:  Vital signs as above.  No acute distress.  The patient is well nourished and well groomed.    PSYCHIATRIC:  The  patient is awake, alert, oriented to person, place and time.  The patient is answering questions appropriately with clear speech.  Normal affect.  HEENT: Normocephalic, atraumatic.  Sclera clear.    SKIN:  Skin over the face, posterior torso, bilateral upper and lower extremities is clean, dry, intact without rashes.  VASCULAR: No significant lower extremity edema.  MUSCULOSKELETAL: Patient ambulates with a flexed forward posture at the hips.  The patient is able to rise onto toes and heels bilaterally with support.  Mild tenderness over the bilateral lower lumbar paraspinal muscles.      The patient has 5/5 strength for the bilateral hip flexors, knee flexors/extensors, ankle dorsiflexors/plantar flexors, ankle evertors/invertors.    NEUROLOGICAL: Trace to 1+ patellar, achilles reflexes which are symmetric bilaterally.  No ankle clonus bilaterally.  Subjective diminished sensation plantar aspect both feet.     RESULTS:    I reviewed the x-ray lumbar spine from [facility] dated November 6, 2020.  This shows compression fractures from T11-L5.  There are changes of a kyphoplasty at L5.  There is degenerative spondylolisthesis L4-5.  There is multilevel facet arthropathy.     I reviewed the MRI lumbar spine from [facility] dated April 4, 2021.  This shows chronic compression deformities T9, T11, T12, L1, L2, L3, L4, L5.  There is kyphoplasty cement L5 vertebral body.  At L3-4 there is a disc bulge and exuberant facet arthropathy with mild spinal canal stenosis and severe bilateral foraminal stenosis.  At L4-5 there is 3 mm degenerative spondylolisthesis with severe bilateral foraminal stenosis and no significant spinal canal stenosis.  At L5-S1 there is severe facet arthropathy with moderate to severe bilateral foraminal stenosis.  Please see report for further details.

## 2021-06-16 NOTE — TELEPHONE ENCOUNTER
"Voicemail x2 received from pt's daughter, Alison, requesting call back. Call placed to her to discuss. Unable to leave message as \"message cancelled\".   "

## 2021-06-16 NOTE — TELEPHONE ENCOUNTER
Phone call to patient to discuss. Left detailed message on dedicated voicemail. Encouraged pt to call nurse navigation line if questions or concerns arise.

## 2021-06-16 NOTE — TELEPHONE ENCOUNTER
----- Message from Graciela Carranza PA-C sent at 4/5/2021  8:04 AM CDT -----  Please call the patient and let her know that I reviewed her MRI lumbar spine.  There are no acute fractures.  There is significant arthritis in the joints in her lower back which is causing narrowing some narrowing around the nerves as they exit out of the spine. I recommend a bilateral L4/5 TFESI as the next step.  I also recommend that the patient return to physical therapy for a refresher course so that she can resume her home exercise program.  I am happy to have a follow up visit with the patient if she prefers.

## 2021-06-16 NOTE — PROGRESS NOTES
ASSESSMENT: Radha Maddox is a 80 y.o. female with past medical history significant for multiple myeloma (follows with Dr. Everardo Quick [facility]), anxiety, depression who presents today for new patient evaluation of chronic and progressive right greater than left low back pain without radicular symptoms.  Patient has multiple chronic compression fractures thoracic and lumbar spine.  At L4-5 there is grade 1 degenerative spondylolisthesis.  An MRI lumbar spine from 2017 showed mild spinal stenosis L3-4 and L4-5.  I am concerned that the lumbar spinal stenosis has progressed.  Patient complains of limited walking and standing tolerance with a positive shopping cart sign.  Patient was neurologically intact.  -Patient also has some upper back pain between the shoulder blades.  She states this pain comes and goes and is not her primary concern today.    SAL: 56  Who 5:9    PLAN:  A shared decision making model was used.  The patient's values and choices were respected.  The following represents what was discussed and decided upon by the physician assistant and the patient.  Patient's daughter is present for the visit and helps to provide history.    1.  DIAGNOSTIC TESTS: I reviewed the report of an MRI lumbar spine from 2017.  I reviewed recent lumbar spine x-rays.  I ordered an updated MRI lumbar spine for further evaluation.  I would like to evaluate if she has had progressive lumbar spinal stenosis given her worsening symptoms.    2.  PHYSICAL THERAPY: No physical therapy was ordered today.  Patient did physical therapy most recently in 2019.  She admits she is not doing her home exercises on a regular basis.  We will await the results of the MRI.  At that time I will likely recommend a referral back to physical therapy.    3.  MEDICATIONS: No changes are made to the patient's medications.  Patient recently established care with [facility]  She takes oxycodone 5 mg 1-2 tabs 3  times daily.  According to the palliative care note they are going to consider adding OxyContin 10 mg at bedtime.  She uses Lidoderm patches.  She takes Tylenol 1000 mg 3 times daily.  She has tried gabapentin in the past and it was not helpful.    4.  INTERVENTIONS: No interventions were ordered today.  We will await the results of her MRI lumbar spine.  I may order an epidural steroid injection pending those results.  -Patient has bilateral L3, L4, L5 radiofrequency ablation May 24, 2019.  She states this was not helpful.  -Patient had tried right L4-5, L5-S1 facet joint injections August 31, 2018 and they were not helpful.    5.  PATIENT EDUCATION: Patient is in agreement the above plan.  All questions were answered.    6.  FOLLOW-UP:   A nurse will call the patient with the results of her MRI lumbar spine.  At that time I may order an epidural steroid injection versus have the patient return to clinic to review the results and discuss treatment options.  If she has questions or concerns in the meantime, she should not hesitate to call.      SUBJECTIVE:  Radha Maddox  Is a 80 y.o. female who presents today for new patient evaluation of low back pain.  Patient reports that she has had chronic pain for many years.  She has multiple myeloma.  She has multiple compression fractures.  Patient had an L5 kyphoplasty in [place] in 2012 or 2013.  Patient reports that that procedure was not helpful.  She states that the surgeon told her that some cement leaked out.  Patient's daughter feels like her pain in her walking actually got worse after that procedure and has been getting progressively worse ever since.  Patient's daughter notes that over the past few months she has had more difficulty with walking.    Patient complains of low back pain.  Pain is located centrally in the lower lumbar region.  Pain then spreads along the right side of the lower back.  She denies any pain radiating down the legs.  She describes the  pain as a constant ache.  She states that it feels like bones are rubbing together in her lower back.  Pain is aggravated with walking.  She can only walk a half a block.  Pain is also aggravated with standing.  She can only stand for 5 minutes.  Pain resolves completely with sitting.  Patient notes that she is able to stand longer and walk further if she can lean forward, such as on a shopping cart.  Patient's daughter is concerned that her mom is walking more hunched forward.  Patient denies any numbness, tingling, or weakness down the legs.    Patient also has some pain between the shoulder blades.  She states that pain comes and goes and is not her primary concern today.    Patient had physical therapy most recently in 2019.  This was pool therapy.  She also previously had land therapy.  She states the pool therapy felt good while she was in the water but she did not feel like the land exercises were helpful.  She does not go to a chiropractor.  She had right L4-5, L5-S1 facet joint injections August 31, 2018.  She states these were not helpful.  She had a bilateral L3, L4, L5 radiofrequency ablation May 24, 2018.  She states it was not helpful.  Only spine surgery was the kyphoplasty L5 2012 or 2013 in [place].  Patient is currently seeing oxycodone 5 mg 1-2 tabs 3 times daily.  She uses Lidoderm patches.  She also takes Tylenol 1000 mg 3 times daily.  She states these medications are helpful.  She recently met with palliative care at [facility] and according to their note they may add OxyContin 10 mg at bedtime.  Patient previously on methadone which was not helpful.  MS Contin caused confusion.  She tried gabapentin and it was not helpful.    Current Outpatient Medications on File Prior to Encounter   Medication Sig Dispense Refill     acetaminophen (TYLENOL) 500 MG tablet Take 1,000 mg by mouth 3 (three) times a day.       lansoprazole (PREVACID) 15 MG capsule Take 15 mg by mouth 2 (two) times a day.        lidocaine (LIDODERM) 5 % Place 1 patch on the skin 2 (two) times a day. Apply one Patch to Back       mirtazapine (REMERON) 7.5 MG tablet Take 7.5 mg by mouth bedtime.       oxyCODONE (ROXICODONE) 5 MG immediate release tablet Take 2.5-5 mg by mouth every 4 (four) hours as needed for pain. 2.5mg tab for pain 1-5  5mg tab for pain 5-10       No current facility-administered medications on file prior to encounter.        No Known Allergies    Past Medical History:   Diagnosis Date     Multiple myeloma (H)      Severe malnutrition (H)         Past Surgical History:   Procedure Laterality Date     BONE MARROW BIOPSY       TOTAL KNEE ARTHROPLASTY       VERTEBRAL KYPHOPLASTY          Family history: Son had an aneurysm    Social history: Patient lives alone.  She has 2 daughters nearby that help her with appointments.  She denies tobacco, alcohol, or illicit drug use.      ROS: Positive for hoarseness, changes in vision, blindness, feet/leg swelling, shortness of breath, joint pain, muscle pain, muscle fatigue, excessive tiredness, anxiety.  Specifically negative for bowel/bladder dysfunction, fevers,chills, appetite changes, unexplained weight loss.   Otherwise 13 systems reviewed are negative.  Please see the patient's intake questionnaire from today for details.      OBJECTIVE:  PHYSICAL EXAMINATION:    CONSTITUTIONAL:  Vital signs as above.  No acute distress.  The patient is well nourished and well groomed.  PSYCHIATRIC:  The patient is awake, alert, oriented to person, place, time and answering questions appropriately with clear speech.    HEENT: Normocephalic, atraumatic.  Sclera clear.  Neck is supple.  SKIN:  Skin over the face, bilateral lower extremities, and posterior torso is clean, dry, intact without rashes.    GAIT:  Gait is guarded.  She ambulates with a significantly flexed forward posture at the hips.  The patient is able to rise onto toes and heels bilaterally with support.  STANDING EXAMINATION:  Tender to palpation right greater than left lower lumbar paraspinous muscles.  Lumbar flexion intact.  Lumbar extension severely restricted.  Patient cannot extend past neutral.  MUSCLE STRENGTH:  The patient has 5/5 strength for the bilateral hip flexors, knee flexors/extensors, ankle dorsiflexors/plantar flexors, great toe extensors, ankle evertors/invertors.  NEUROLOGICAL: Trace to 1+ patellar, and achilles reflexes bilaterally.  Negative Babinski's bilaterally.  No ankle clonus bilaterally. Sensation to light touch is intact in the bilateral L4, L5, and S1 dermatomes.  VASCULAR:  2/4 dorsalis pedis pulses bilaterally.  Bilateral lower extremities are warm.  There is no pitting edema of the bilateral lower extremities.  ABDOMINAL:  Soft, non-distended, non-tender throughout all quadrants.  No pulsatile mass palpated in the left lower quadrant.  LYMPH NODES:  No palpable or tender inguinal lymph nodes.  MUSCULOSKELETAL: Straight leg raise is negative bilaterally.    RESULTS:    I reviewed the x-ray lumbar spine from [facility] dated November 6, 2020.  This shows compression fractures from T11-L5.  There are changes of a kyphoplasty at L5.  There is degenerative spondylolisthesis L4-5.  There is multilevel facet arthropathy.    I reviewed a report of an MRI lumbar spine with and without contrast from August 3, 2017.  These show multiple chronic thoracic and lumbar compression fractures.  There is extensive facet arthropathy.  At L4-5 there is grade 1 spondylolisthesis.  There is mild spinal canal stenosis and mild bilateral foraminal stenosis L3-4 and L4-5.  At L2-3 there is mild to moderate right and mild left foraminal stenosis.  At L5-S1 there is mild to moderate right and mild left foraminal stenosis.  Please see report for further details.

## 2021-06-17 NOTE — TELEPHONE ENCOUNTER
"PSP:  Graciela Carranza PA-C  Last clinic visit:  4/22/21 OV, 4/14/21 Bilateral L4-L5 TFESIs  Reason for call: Follow up call to patient 1 week since last in and 2 weeks post injection  Clinical information:  Patient reports she is still in a lot of pain. Did not get any relief with the injection of 2 weeks ago. \"I just keep taking my medicine.\"   Advice given to patient: Explained PSP wanted to have her return to clinic if pain continued. Stated understanding. Asked that her daughter Alison be called to make this appointment.     "

## 2021-06-17 NOTE — TELEPHONE ENCOUNTER
Phone call to patient's daughter Alison to assist in getting patient scheduled for a follow up office visit. Mail box full; unable to leave a message.

## 2021-06-17 NOTE — TELEPHONE ENCOUNTER
Phone call to discuss setting up a follow up appointment with patient's daughter. Left message to return call.

## 2024-01-01 ENCOUNTER — HOSPITAL ENCOUNTER (EMERGENCY)
Facility: CLINIC | Age: 84
Discharge: HOME | End: 2024-04-05
Attending: EMERGENCY MEDICINE | Admitting: EMERGENCY MEDICINE
Payer: COMMERCIAL

## 2024-01-01 ENCOUNTER — MEDICAL CORRESPONDENCE (OUTPATIENT)
Dept: TRANSPLANT | Facility: CLINIC | Age: 84
End: 2024-01-01
Payer: COMMERCIAL

## 2024-01-01 ENCOUNTER — APPOINTMENT (OUTPATIENT)
Dept: CT IMAGING | Facility: CLINIC | Age: 84
End: 2024-01-01
Attending: EMERGENCY MEDICINE
Payer: COMMERCIAL

## 2024-01-01 VITALS
RESPIRATION RATE: 17 BRPM | SYSTOLIC BLOOD PRESSURE: 112 MMHG | HEIGHT: 62 IN | DIASTOLIC BLOOD PRESSURE: 64 MMHG | OXYGEN SATURATION: 97 % | TEMPERATURE: 99.1 F | BODY MASS INDEX: 22.08 KG/M2 | HEART RATE: 85 BPM | WEIGHT: 120 LBS

## 2024-01-01 DIAGNOSIS — R07.9 CHEST PAIN, UNSPECIFIED TYPE: ICD-10-CM

## 2024-01-01 LAB
ANION GAP SERPL CALCULATED.3IONS-SCNC: 12 MMOL/L (ref 7–15)
ATRIAL RATE - MUSE: 91 BPM
BASOPHILS # BLD AUTO: 0 10E3/UL (ref 0–0.2)
BASOPHILS NFR BLD AUTO: 0 %
BUN SERPL-MCNC: 7.3 MG/DL (ref 8–23)
CALCIUM SERPL-MCNC: 9.3 MG/DL (ref 8.8–10.2)
CHLORIDE SERPL-SCNC: 108 MMOL/L (ref 98–107)
CREAT SERPL-MCNC: 0.81 MG/DL (ref 0.51–0.95)
DEPRECATED HCO3 PLAS-SCNC: 23 MMOL/L (ref 22–29)
DIASTOLIC BLOOD PRESSURE - MUSE: NORMAL MMHG
EGFRCR SERPLBLD CKD-EPI 2021: 71 ML/MIN/1.73M2
EOSINOPHIL # BLD AUTO: 0 10E3/UL (ref 0–0.7)
EOSINOPHIL NFR BLD AUTO: 0 %
ERYTHROCYTE [DISTWIDTH] IN BLOOD BY AUTOMATED COUNT: 19.9 % (ref 10–15)
GLUCOSE SERPL-MCNC: 89 MG/DL (ref 70–99)
HCT VFR BLD AUTO: 21.2 % (ref 35–47)
HGB BLD-MCNC: 6.8 G/DL (ref 11.7–15.7)
HOLD SPECIMEN: NORMAL
IMM GRANULOCYTES # BLD: 0 10E3/UL
IMM GRANULOCYTES NFR BLD: 0 %
INTERPRETATION ECG - MUSE: NORMAL
LYMPHOCYTES # BLD AUTO: 1.1 10E3/UL (ref 0.8–5.3)
LYMPHOCYTES NFR BLD AUTO: 32 %
MCH RBC QN AUTO: 30 PG (ref 26.5–33)
MCHC RBC AUTO-ENTMCNC: 32.1 G/DL (ref 31.5–36.5)
MCV RBC AUTO: 93 FL (ref 78–100)
MONOCYTES # BLD AUTO: 0.4 10E3/UL (ref 0–1.3)
MONOCYTES NFR BLD AUTO: 11 %
NEUTROPHILS # BLD AUTO: 1.9 10E3/UL (ref 1.6–8.3)
NEUTROPHILS NFR BLD AUTO: 56 %
NRBC # BLD AUTO: 0 10E3/UL
NRBC BLD AUTO-RTO: 0 /100
P AXIS - MUSE: 37 DEGREES
PLATELET # BLD AUTO: 169 10E3/UL (ref 150–450)
POTASSIUM SERPL-SCNC: 3.7 MMOL/L (ref 3.4–5.3)
PR INTERVAL - MUSE: 162 MS
QRS DURATION - MUSE: 84 MS
QT - MUSE: 350 MS
QTC - MUSE: 430 MS
R AXIS - MUSE: -2 DEGREES
RBC # BLD AUTO: 2.27 10E6/UL (ref 3.8–5.2)
SODIUM SERPL-SCNC: 143 MMOL/L (ref 135–145)
SYSTOLIC BLOOD PRESSURE - MUSE: NORMAL MMHG
T AXIS - MUSE: 0 DEGREES
TROPONIN T SERPL HS-MCNC: 13 NG/L
TROPONIN T SERPL HS-MCNC: 16 NG/L
VENTRICULAR RATE- MUSE: 91 BPM
WBC # BLD AUTO: 3.4 10E3/UL (ref 4–11)

## 2024-01-01 PROCEDURE — 84484 ASSAY OF TROPONIN QUANT: CPT | Performed by: EMERGENCY MEDICINE

## 2024-01-01 PROCEDURE — 250N000011 HC RX IP 250 OP 636: Performed by: EMERGENCY MEDICINE

## 2024-01-01 PROCEDURE — 99285 EMERGENCY DEPT VISIT HI MDM: CPT | Mod: 25

## 2024-01-01 PROCEDURE — 36415 COLL VENOUS BLD VENIPUNCTURE: CPT | Performed by: EMERGENCY MEDICINE

## 2024-01-01 PROCEDURE — 93005 ELECTROCARDIOGRAM TRACING: CPT | Performed by: EMERGENCY MEDICINE

## 2024-01-01 PROCEDURE — 85004 AUTOMATED DIFF WBC COUNT: CPT | Performed by: EMERGENCY MEDICINE

## 2024-01-01 PROCEDURE — 71275 CT ANGIOGRAPHY CHEST: CPT

## 2024-01-01 PROCEDURE — 82374 ASSAY BLOOD CARBON DIOXIDE: CPT | Performed by: EMERGENCY MEDICINE

## 2024-01-01 RX ORDER — IOPAMIDOL 755 MG/ML
90 INJECTION, SOLUTION INTRAVASCULAR ONCE
Status: COMPLETED | OUTPATIENT
Start: 2024-01-01 | End: 2024-01-01

## 2024-01-01 RX ADMIN — IOPAMIDOL 90 ML: 755 INJECTION, SOLUTION INTRAVENOUS at 15:55

## 2024-01-01 ASSESSMENT — HEART SCORE
HEART SCORE: 3
TROPONIN: LESS THAN OR EQUAL TO NORMAL LIMIT
ECG: NORMAL
AGE: 65+
HISTORY: SLIGHTLY SUSPICIOUS
RISK FACTORS: 1-2 RISK FACTORS

## 2024-01-01 ASSESSMENT — COLUMBIA-SUICIDE SEVERITY RATING SCALE - C-SSRS
1. IN THE PAST MONTH, HAVE YOU WISHED YOU WERE DEAD OR WISHED YOU COULD GO TO SLEEP AND NOT WAKE UP?: NO
2. HAVE YOU ACTUALLY HAD ANY THOUGHTS OF KILLING YOURSELF IN THE PAST MONTH?: NO
6. HAVE YOU EVER DONE ANYTHING, STARTED TO DO ANYTHING, OR PREPARED TO DO ANYTHING TO END YOUR LIFE?: NO

## 2024-01-01 ASSESSMENT — ACTIVITIES OF DAILY LIVING (ADL)
ADLS_ACUITY_SCORE: 35

## 2024-04-05 NOTE — ED NOTES
AIDET performed, white board updated for rounding. Patient updated on plan of care. Patient's pain assessed. Call light within reach, bed in low position, side rails up. Visitor at bedside: none

## 2024-04-05 NOTE — ED PROVIDER NOTES
EMERGENCY DEPARTMENT ENCOUNTER      NAME: Radha Maddox  AGE: 83 year old female  YOB: 1940  MRN: [id number]  EVALUATION DATE & TIME: No admission date for patient encounter.    PCP: Corky Bryson    ED PROVIDER: Adrienne Lees M.D.      Chief Complaint   Patient presents with    Chest Pain         FINAL IMPRESSION:  1. Chest pain, unspecified type          ED COURSE & MEDICAL DECISION MAKING:    ED Course as of 04/05/24 1805   Fri Apr 05, 2024   1519 Pt with atypical chest pain on and off since October 2023 with HEART score 3, reassuring EKG today, PERC+ and wells PE moderate pretest probabililty for acute PE, pending CTA r/o PE, troponin to screen for ACS, hemoglobin 6.8 with known chronic anemia attributed formerly to her known multiple myeloma. I spoke with patient and with her daughter who is at the bedside right now, and offered ED blood transfusin as hemoglobin here is 6.8, chart review attempted but her baseline not available when I reviewed chart and through the [facility] system where she follows up with [facility] Oncology team. Patient notes her hemoglobin is usually in that range and she has frequent blood transfusions through outpatient infusion clinic at [facility] and has planned upcoming appointment with oncology at [facility] with impending anticipated transfusion in one week, no worsening chest pain since October to suggest this may relate to today's hemoglobin as opposed to last week or a month ago, and they were offered and decline blood transfusion, note they prefer to wait until outpatient transfusion anticipated for this upcoming week. I expressed concern that her anemia could relate to chest pain, they decline at this time and note they will absolutely be ok with plan to r/o PE and ACS and then reassess.   1619 Initial high sensitivity troponin = 16 at 1454, repeat 2h delta troponin ordered for 1655 to r/o ACS per protocol, patient in CT imaging now.   1643 CT chest  reassuringly negative for acute pathology   1659 Pt clinically reassessed and feeling well with normal VS and no pain at all, no SOB and no symptoms, again offered ED transfusion with chronic chest pain with hemoglobin 6.8 with known transfusion dependence, she and daughter decline noting they would still prefer to follow up with oncology team and infusion clinic through [facility] this upcoming week as planned for transfusion in the outpatient setting as they believe this is  her baseline anemia. Repeat troponin without uptrend and per high sensitivity troponin screening protocol, reassuringly ACS is ruled out. Patient discharged after being provided with extensive anticipatory guidance and given return precautions, importance of PMD follow-up emphasized.        Pertinent Labs & Imaging studies reviewed. (See chart for details)    N95 worn  A face shield was worn also  COVID PPE    Medical Decision Making  Obtained supplemental history:Supplemental history obtained?: Documented in chart and Family Member/Significant Other  Reviewed external records: External records reviewed?: No  Care impacted by chronic illness:Dementia  Care significantly affected by social determinants of health:N/A  Did you consider but not order tests?: Work up considered but not performed and documented in chart, if applicable  Did you interpret images independently?: Independent interpretation of ECG and images noted in documentation, when applicable.  Consultation discussion with other provider:Did you involve another provider (consultant, MH, pharmacy, etc.)?: No  Discharge. No recommendations on prescription strength medication(s). I considered admission, but discharged patient after significant clinical improvement.    At the conclusion of the encounter I discussed the results of all of the tests and the disposition. The questions were answered. The patient or family acknowledged understanding and was agreeable with the care plan.      MEDICATIONS GIVEN IN THE EMERGENCY:  Medications   iopamidol (ISOVUE-370) solution 90 mL (90 mLs Intravenous $Given 4/5/24 3559)       NEW PRESCRIPTIONS STARTED AT TODAY'S ER VISIT  New Prescriptions    No medications on file          =================================================================    HPI      Radha Maddox is a 83 year old female with PMHx of Alzheimer's disease, multiple myeloma who presents to the ED today via private vehicle with chest pain.    She is here today with her daughter, who is also here to be seen in the ER. Patient reports that since October 2023 she has had midsternal intermittent chest pressure that comes and goes daily without exacerbating or alleviating factors since her visit to [facility] in October 2023 with pneumonia. No current discomfort. Moderately severe and nonradiating when it happens. No cough or fever, no sick contacts, no abdominal pain or shortness of breath, no tobacco abuse currently, no history of heart attack or stents. No medications taken for it today.      REVIEW OF SYSTEMS   All other systems reviewed and are negative except as noted above in HPI.    PAST MEDICAL HISTORY:  Past Medical History:   Diagnosis Date    Multiple myeloma (H)     Severe malnutrition (H24)        PAST SURGICAL HISTORY:  Past Surgical History:   Procedure Laterality Date    BONE MARROW BIOPSY, BONE SPECIMEN, NEEDLE/TROCAR      OTHER SURGICAL HISTORY      VERTEBRAL KYPHOPLASTY     TOTAL KNEE ARTHROPLASTY         CURRENT MEDICATIONS:    acetaminophen (TYLENOL) 500 MG tablet  cyclobenzaprine (FLEXERIL) 5 MG tablet  diclofenac sodium (VOLTAREN) 1 % Gel  lansoprazole (PREVACID) 15 MG capsule  lidocaine (LIDODERM) 5 %  LORazepam (ATIVAN) 1 MG tablet  mirtazapine (REMERON) 7.5 MG tablet  oxyCODONE (ROXICODONE) 5 MG immediate release tablet        ALLERGIES:  No Known Allergies    FAMILY HISTORY:  No family history on file.    SOCIAL HISTORY:   Social History     Socioeconomic  "History    Marital status: Legally      Spouse name: None    Number of children: None    Years of education: None    Highest education level: None   Tobacco Use    Smoking status: Former    Smokeless tobacco: Never    Tobacco comments:     smoked for 20-30 years, maybe 1/2 pack per day   Substance and Sexual Activity    Alcohol use: No    Drug use: No       VITALS:  Patient Vitals for the past 24 hrs:   BP Temp Temp src Pulse Resp SpO2 Height Weight   04/05/24 1655 135/64 -- -- 86 17 98 % -- --   04/05/24 1615 (!) 144/66 -- -- 86 17 98 % -- --   04/05/24 1530 (!) 164/75 -- -- 91 18 99 % -- --   04/05/24 1433 (!) 143/66 99.1  F (37.3  C) Temporal 98 18 98 % 1.575 m (5' 2\") 54.4 kg (120 lb)       PHYSICAL EXAM    GENERAL: Awake, alert.  In no acute distress.   HEENT: Normocephalic, atraumatic.  Pupils equal, round and reactive.  Conjunctiva normal.  EOMI.  NECK: No stridor or apparent deformity.  PULMONARY: Symmetrical breath sounds without distress.  Lungs clear to auscultation bilaterally without wheezes, rhonchi or rales.  CARDIO: Regular rate and rhythm.  No significant murmur, rub or gallop.  Radial pulses strong and symmetrical.  ABDOMINAL: Abdomen soft, non-distended and non-tender to palpation.  No CVAT, no palpable hepatosplenomegaly.  EXTREMITIES: No lower extremity swelling or edema.    NEURO: Alert and oriented to person, place and time.  Cranial nerves grossly intact.  No focal motor deficit.  PSYCH: Normal mood and affect  SKIN: No rashes      LAB:  All pertinent labs reviewed and interpreted.  Results for orders placed or performed during the hospital encounter of 04/05/24   CT Chest Pulmonary Embolism w Contrast    Impression    IMPRESSION:    1.  No acute pulmonary embolism or aortopathy.  2.  Shallow inflation with atelectasis along the left mediastinal pleura and right interlobar fissures. No acute interstitial or alveolar inflammatory process.  3.  Severe bone demineralization and numerous " moderate to severe compression deformities.   Extra Blue Top Tube   Result Value Ref Range    Hold Specimen JIC    Extra Red Top Tube   Result Value Ref Range    Hold Specimen JIC    Extra Green Top (Lithium Heparin) Tube   Result Value Ref Range    Hold Specimen JIC    Extra Purple Top Tube   Result Value Ref Range    Hold Specimen JIC    Basic metabolic panel   Result Value Ref Range    Sodium 143 135 - 145 mmol/L    Potassium 3.7 3.4 - 5.3 mmol/L    Chloride 108 (H) 98 - 107 mmol/L    Carbon Dioxide (CO2) 23 22 - 29 mmol/L    Anion Gap 12 7 - 15 mmol/L    Urea Nitrogen 7.3 (L) 8.0 - 23.0 mg/dL    Creatinine 0.81 0.51 - 0.95 mg/dL    GFR Estimate 71 >60 mL/min/1.73m2    Calcium 9.3 8.8 - 10.2 mg/dL    Glucose 89 70 - 99 mg/dL   Result Value Ref Range    Troponin T, High Sensitivity 16 (H) <=14 ng/L   CBC with platelets and differential   Result Value Ref Range    WBC Count 3.4 (L) 4.0 - 11.0 10e3/uL    RBC Count 2.27 (L) 3.80 - 5.20 10e6/uL    Hemoglobin 6.8 (LL) 11.7 - 15.7 g/dL    Hematocrit 21.2 (L) 35.0 - 47.0 %    MCV 93 78 - 100 fL    MCH 30.0 26.5 - 33.0 pg    MCHC 32.1 31.5 - 36.5 g/dL    RDW 19.9 (H) 10.0 - 15.0 %    Platelet Count 169 150 - 450 10e3/uL    % Neutrophils 56 %    % Lymphocytes 32 %    % Monocytes 11 %    % Eosinophils 0 %    % Basophils 0 %    % Immature Granulocytes 0 %    NRBCs per 100 WBC 0 <1 /100    Absolute Neutrophils 1.9 1.6 - 8.3 10e3/uL    Absolute Lymphocytes 1.1 0.8 - 5.3 10e3/uL    Absolute Monocytes 0.4 0.0 - 1.3 10e3/uL    Absolute Eosinophils 0.0 0.0 - 0.7 10e3/uL    Absolute Basophils 0.0 0.0 - 0.2 10e3/uL    Absolute Immature Granulocytes 0.0 <=0.4 10e3/uL    Absolute NRBCs 0.0 10e3/uL   Result Value Ref Range    Troponin T, High Sensitivity 13 <=14 ng/L   ECG 12-LEAD WITH MUSE (LHE)   Result Value Ref Range    Systolic Blood Pressure  mmHg    Diastolic Blood Pressure  mmHg    Ventricular Rate 91 BPM    Atrial Rate 91 BPM    MD Interval 162 ms    QRS Duration 84 ms    QT  350 ms    QTc 430 ms    P Axis 37 degrees    R AXIS -2 degrees    T Axis 0 degrees    Interpretation ECG       Sinus rhythm  Nonspecific T wave abnormality  Abnormal ECG  No previous ECGs available  Confirmed by SEE ED PROVIDER NOTE FOR, ECG INTERPRETATION ([id number]),  Yaneth Quintero ([id number]) on 4/5/2024 4:55:42 PM         RADIOLOGY:  Reviewed all pertinent imaging. Please see official radiology report.  CT Chest Pulmonary Embolism w Contrast   Final Result   IMPRESSION:      1.  No acute pulmonary embolism or aortopathy.   2.  Shallow inflation with atelectasis along the left mediastinal pleura and right interlobar fissures. No acute interstitial or alveolar inflammatory process.   3.  Severe bone demineralization and numerous moderate to severe compression deformities.            EKG:    Reviewed and interpreted as: 1447 normal sinus rhythm without ST abnormalities, HR 91, no prior EKG available for comparison purposes      I have independently reviewed and interpreted the EKG(s) documented above.         Adrienne Lees MD  04/05/24 0905

## 2024-04-05 NOTE — ED TRIAGE NOTES
Pt c/o mid sternal chest pain that has been off and on for the last couple of weeks. She denies any cardiac hx. The pain feels achy and nothing really makes it better or worse. No meds PTA. The pain started again this morning and has not changed     Triage Assessment (Adult)       Row Name 04/05/24 9196          Triage Assessment    Airway WDL WDL        Respiratory WDL    Respiratory WDL WDL        Skin Circulation/Temperature WDL    Skin Circulation/Temperature WDL WDL        Cardiac WDL    Cardiac WDL X;chest pain        Chest Pain Assessment    Chest Pain Location midsternal     Chest Pain Radiation --  none     Character aching     Duration Off and on for the past few weeks     Precipitating Factors nothing     Alleviating Factors nothing        Peripheral/Neurovascular WDL    Peripheral Neurovascular WDL WDL        Cognitive/Neuro/Behavioral WDL    Cognitive/Neuro/Behavioral WDL WDL                     
1 or 2